# Patient Record
Sex: FEMALE | Race: WHITE
[De-identification: names, ages, dates, MRNs, and addresses within clinical notes are randomized per-mention and may not be internally consistent; named-entity substitution may affect disease eponyms.]

---

## 2018-12-26 ENCOUNTER — TRANSCRIPTION ENCOUNTER (OUTPATIENT)
Age: 58
End: 2018-12-26

## 2021-01-23 ENCOUNTER — TRANSCRIPTION ENCOUNTER (OUTPATIENT)
Age: 61
End: 2021-01-23

## 2021-01-27 ENCOUNTER — TRANSCRIPTION ENCOUNTER (OUTPATIENT)
Age: 61
End: 2021-01-27

## 2021-06-04 ENCOUNTER — RESULT REVIEW (OUTPATIENT)
Age: 61
End: 2021-06-04

## 2022-09-01 ENCOUNTER — NON-APPOINTMENT (OUTPATIENT)
Age: 62
End: 2022-09-01

## 2022-09-06 ENCOUNTER — APPOINTMENT (OUTPATIENT)
Dept: SURGERY | Facility: CLINIC | Age: 62
End: 2022-09-06

## 2022-09-06 ENCOUNTER — NON-APPOINTMENT (OUTPATIENT)
Age: 62
End: 2022-09-06

## 2022-09-06 VITALS — WEIGHT: 168 LBS | BODY MASS INDEX: 26.37 KG/M2 | HEIGHT: 67 IN

## 2022-09-06 DIAGNOSIS — Z85.828 PERSONAL HISTORY OF OTHER MALIGNANT NEOPLASM OF SKIN: ICD-10-CM

## 2022-09-06 DIAGNOSIS — Z86.79 PERSONAL HISTORY OF OTHER DISEASES OF THE CIRCULATORY SYSTEM: ICD-10-CM

## 2022-09-06 DIAGNOSIS — Z87.442 PERSONAL HISTORY OF URINARY CALCULI: ICD-10-CM

## 2022-09-06 DIAGNOSIS — Z78.9 OTHER SPECIFIED HEALTH STATUS: ICD-10-CM

## 2022-09-06 DIAGNOSIS — Z86.39 PERSONAL HISTORY OF OTHER ENDOCRINE, NUTRITIONAL AND METABOLIC DISEASE: ICD-10-CM

## 2022-09-06 DIAGNOSIS — Z80.8 FAMILY HISTORY OF MALIGNANT NEOPLASM OF OTHER ORGANS OR SYSTEMS: ICD-10-CM

## 2022-09-06 DIAGNOSIS — Z87.19 PERSONAL HISTORY OF OTHER DISEASES OF THE DIGESTIVE SYSTEM: ICD-10-CM

## 2022-09-06 PROCEDURE — 99204 OFFICE O/P NEW MOD 45 MIN: CPT

## 2022-09-06 RX ORDER — IRBESARTAN AND HYDROCHLOROTHIAZIDE 150; 12.5 MG/1; MG/1
150-12.5 TABLET ORAL
Qty: 30 | Refills: 0 | Status: ACTIVE | COMMUNITY
Start: 2022-08-17

## 2022-09-06 RX ORDER — OMEPRAZOLE 20 MG/1
20 CAPSULE, DELAYED RELEASE ORAL
Qty: 30 | Refills: 0 | Status: ACTIVE | COMMUNITY
Start: 2022-09-03

## 2022-09-06 RX ORDER — ATENOLOL 25 MG/1
25 TABLET ORAL
Qty: 30 | Refills: 0 | Status: ACTIVE | COMMUNITY
Start: 2022-08-17

## 2022-09-06 RX ORDER — ROSUVASTATIN CALCIUM 10 MG/1
10 TABLET, FILM COATED ORAL
Qty: 30 | Refills: 0 | Status: ACTIVE | COMMUNITY
Start: 2022-08-12

## 2022-09-06 NOTE — HISTORY OF PRESENT ILLNESS
[de-identified] : Patient referred by Dr. Schmitz for evaluation of multinodular goiter.  Patient reports several year history of thyroid nodules, denies prior biopsy.  Denies dysphagia, change in voice or radiation exposure.  Thyroid ultrasound August 2022: Right lobe 5 x 1.6 x 1.8 cm with 5 x 4 x 4 mm mid nodule slightly increased.  Right lower 3 x 3 x 2 mm nodule.  Left lobe 4.7 x 1.4 x 1.5 cm with stable lower 5 x 3 x 2 mm nodule and lower 10 x 9 x 7 mm nodule increased from prior study T RADS 4.  No abnormal lymph nodes noted.  Patient reports thyroid functions have been normal.  I have reviewed all old and new data and available images.  Additional information was obtained from others present at the time of the visit to ensure the completeness of the history.\par

## 2022-09-06 NOTE — REASON FOR VISIT
[Initial Consultation] : an initial consultation for [FreeTextEntry2] : Multinodular goiter and euthyroid [Spouse] : spouse

## 2022-09-06 NOTE — PHYSICAL EXAM
[de-identified] : no cervical or supraclavicular adenopathy, trachea midline, thyroid without enlargement or palpable mass [Normal] : no neck adenopathy [de-identified] : Skin:  normal appearance.  no rash, nodules, vesicles, or erythema,\par Musculoskeletal:  full range of motion and no deformities appreciated\par Neurological:  grossly intact\par Psychiatric:  oriented to person, place and time with appropriate affect

## 2022-09-06 NOTE — ASSESSMENT
[FreeTextEntry1] : Patient with several year history of multinodular goiter with slight increase in left nodule.  I have discussed an ultrasound-guided fine-needle aspiration versus 6-month follow-up ultrasound.  Patient initially was interested in a total thyroidectomy.  I have recommended observation and reviewed the low risk of malignancy and excellent outcome in patients with thyroid cancer.  I have reviewed the pathophysiology of the disease process, the area anatomy and the rationale for surgery.  I discussed the risks, benefits and alternative treatments which include but are not limited to bleeding, infection, numbness, hoarseness, hypocalcemia, scarring, and need for reoperation.  I have answered the patient's questions to their satisfaction.  I have recommended a repeat ultrasound February 2023, RTO 6 months.

## 2022-09-06 NOTE — CONSULT LETTER
[Dear  ___] : Dear  [unfilled], [Consult Letter:] : I had the pleasure of evaluating your patient, [unfilled]. [Please see my note below.] : Please see my note below. [Consult Closing:] : Thank you very much for allowing me to participate in the care of this patient.  If you have any questions, please do not hesitate to contact me. [Sincerely,] : Sincerely, [FreeTextEntry2] : Dr. Ally Schmitz [FreeTextEntry3] : Claudia Renner MD, FACS\par Assistant Professor of Surgery and Otolaryngology\par [facility]\par

## 2022-11-25 ENCOUNTER — RESULT REVIEW (OUTPATIENT)
Age: 62
End: 2022-11-25

## 2023-03-07 ENCOUNTER — APPOINTMENT (OUTPATIENT)
Dept: SURGERY | Facility: CLINIC | Age: 63
End: 2023-03-07

## 2023-08-10 ENCOUNTER — APPOINTMENT (OUTPATIENT)
Dept: SURGERY | Facility: CLINIC | Age: 63
End: 2023-08-10
Payer: COMMERCIAL

## 2023-08-10 PROCEDURE — 99213 OFFICE O/P EST LOW 20 MIN: CPT

## 2023-08-10 NOTE — HISTORY OF PRESENT ILLNESS
[de-identified] : Patient referred by Dr. Schmitz for evaluation of multinodular goiter.  Patient reports several year history of thyroid nodules, denies prior biopsy.  Denies dysphagia, change in voice or radiation exposure.  Thyroid ultrasound August 2022: Right lobe 5 x 1.6 x 1.8 cm with 5 x 4 x 4 mm mid nodule slightly increased.  Right lower 3 x 3 x 2 mm nodule.  Left lobe 4.7 x 1.4 x 1.5 cm with stable lower 5 x 3 x 2 mm nodule and lower 10 x 9 x 7 mm nodule increased from prior study T RADS 4.  No abnormal lymph nodes noted.  Patient reports thyroid functions have been normal.  Repeat ultrasound April 2023 with dominant left 1 cm nodule now TR 2.  Patient denies recent illness.  I have reviewed all old and new data and available images.  Additional information was obtained from others present at the time of the visit to ensure the completeness of the history.

## 2023-08-10 NOTE — PHYSICAL EXAM
[de-identified] : no cervical or supraclavicular adenopathy, trachea midline, thyroid without enlargement or palpable mass [Normal] : no neck adenopathy [de-identified] : Skin:  normal appearance.  no rash, nodules, vesicles, or erythema,\par  Musculoskeletal:  full range of motion and no deformities appreciated\par  Neurological:  grossly intact\par  Psychiatric:  oriented to person, place and time with appropriate affect

## 2023-08-10 NOTE — ASSESSMENT
[FreeTextEntry1] : Patient with several year history of multinodular goiter with previous slight increase in left nodule.   atable on follow-up ultrasound with less suspicious findings.  I have recommended observation and reviewed the low risk of malignancy and excellent outcome in patients with thyroid cancer.  I have reviewed the pathophysiology of the disease process, the area anatomy and the rationale for surgery.  I discussed the risks, benefits and alternative treatments which include but are not limited to bleeding, infection, numbness, hoarseness, hypocalcemia, scarring, and need for reoperation.  I have answered the patient's questions to their satisfaction.  I have recommended a repeat ultrasound 1/2024, RTO 6 months.

## 2024-01-30 ENCOUNTER — NON-APPOINTMENT (OUTPATIENT)
Age: 64
End: 2024-01-30

## 2024-02-27 ENCOUNTER — APPOINTMENT (OUTPATIENT)
Dept: SURGERY | Facility: CLINIC | Age: 64
End: 2024-02-27
Payer: COMMERCIAL

## 2024-02-27 DIAGNOSIS — E04.2 NONTOXIC MULTINODULAR GOITER: ICD-10-CM

## 2024-02-27 DIAGNOSIS — E04.9 NONTOXIC GOITER, UNSPECIFIED: ICD-10-CM

## 2024-02-27 PROCEDURE — 99213 OFFICE O/P EST LOW 20 MIN: CPT

## 2024-02-27 NOTE — PHYSICAL EXAM
[de-identified] : no cervical or supraclavicular adenopathy, trachea midline, thyroid without enlargement or palpable mass [Normal] : no neck adenopathy [de-identified] : Skin:  normal appearance.  no rash, nodules, vesicles, or erythema,\par  Musculoskeletal:  full range of motion and no deformities appreciated\par  Neurological:  grossly intact\par  Psychiatric:  oriented to person, place and time with appropriate affect

## 2024-02-27 NOTE — HISTORY OF PRESENT ILLNESS
[de-identified] : Patient referred by Dr. Schmitz for evaluation of multinodular goiter.  Patient reports several year history of thyroid nodules, denies prior biopsy.  Denies dysphagia, change in voice or radiation exposure.  Thyroid ultrasound August 2022: Right lobe 5 x 1.6 x 1.8 cm with 5 x 4 x 4 mm mid nodule slightly increased.  Right lower 3 x 3 x 2 mm nodule.  Left lobe 4.7 x 1.4 x 1.5 cm with stable lower 5 x 3 x 2 mm nodule and lower 10 x 9 x 7 mm nodule increased from prior study T RADS 4.  No abnormal lymph nodes noted.  Patient reports thyroid functions have been normal.  Repeat ultrasound April 2023 with dominant left 1 cm nodule now TR 2.  Repeat ultrasound January 2024 with nodule now 1.2 cm, TR 2.   patient denies recent illness. now on monjourno.  I have reviewed all old and new data and available images.  Additional information was obtained from others present at the time of the visit to ensure the completeness of the history.

## 2024-02-27 NOTE — ASSESSMENT
[FreeTextEntry1] : Patient with several year history of multinodular goiter with previous slight increase in left nodule.   minimal change on follow-up ultrasound with less suspicious findings.  I have recommended observation and reviewed the low risk of malignancy and excellent outcome in patients with thyroid cancer.  I have reviewed the pathophysiology of the disease process, the area anatomy and the rationale for surgery.  I discussed the risks, benefits and alternative treatments which include but are not limited to bleeding, infection, numbness, hoarseness, hypocalcemia, scarring, and need for reoperation.  I have answered the patient's questions to their satisfaction.  I have recommended a repeat ultrasound 7/2024, RTO 6 months.

## 2024-12-04 ENCOUNTER — NON-APPOINTMENT (OUTPATIENT)
Age: 64
End: 2024-12-04

## 2024-12-31 ENCOUNTER — APPOINTMENT (OUTPATIENT)
Dept: SURGERY | Facility: CLINIC | Age: 64
End: 2024-12-31
Payer: COMMERCIAL

## 2024-12-31 DIAGNOSIS — E04.2 NONTOXIC MULTINODULAR GOITER: ICD-10-CM

## 2024-12-31 PROCEDURE — G2211 COMPLEX E/M VISIT ADD ON: CPT

## 2024-12-31 PROCEDURE — 99213 OFFICE O/P EST LOW 20 MIN: CPT

## 2024-12-31 RX ORDER — TIRZEPATIDE 15 MG/.5ML
INJECTION, SOLUTION SUBCUTANEOUS
Refills: 0 | Status: ACTIVE | COMMUNITY

## 2025-06-17 ENCOUNTER — NON-APPOINTMENT (OUTPATIENT)
Age: 65
End: 2025-06-17

## 2025-06-17 ENCOUNTER — APPOINTMENT (OUTPATIENT)
Dept: SURGERY | Facility: CLINIC | Age: 65
End: 2025-06-17
Payer: COMMERCIAL

## 2025-06-17 PROCEDURE — G2211 COMPLEX E/M VISIT ADD ON: CPT

## 2025-06-17 PROCEDURE — 99213 OFFICE O/P EST LOW 20 MIN: CPT
